# Patient Record
Sex: MALE | URBAN - METROPOLITAN AREA
[De-identification: names, ages, dates, MRNs, and addresses within clinical notes are randomized per-mention and may not be internally consistent; named-entity substitution may affect disease eponyms.]

---

## 2020-02-26 ENCOUNTER — OFFICE VISIT (OUTPATIENT)
Dept: CARDIOLOGY CLINIC | Age: 70
End: 2020-02-26

## 2020-02-26 VITALS
DIASTOLIC BLOOD PRESSURE: 72 MMHG | HEIGHT: 69 IN | WEIGHT: 221 LBS | HEART RATE: 68 BPM | SYSTOLIC BLOOD PRESSURE: 122 MMHG | BODY MASS INDEX: 32.73 KG/M2 | OXYGEN SATURATION: 95 %

## 2020-02-26 DIAGNOSIS — Z95.810 AICD (AUTOMATIC CARDIOVERTER/DEFIBRILLATOR) PRESENT: Primary | ICD-10-CM

## 2020-02-26 DIAGNOSIS — I10 ESSENTIAL HYPERTENSION: ICD-10-CM

## 2020-02-26 DIAGNOSIS — I25.5 ISCHEMIC CARDIOMYOPATHY: ICD-10-CM

## 2020-02-26 DIAGNOSIS — E11.8 CONTROLLED DIABETES MELLITUS TYPE 2 WITH COMPLICATIONS, UNSPECIFIED WHETHER LONG TERM INSULIN USE (HCC): ICD-10-CM

## 2020-02-26 DIAGNOSIS — I48.0 PAROXYSMAL ATRIAL FIBRILLATION (HCC): ICD-10-CM

## 2020-02-26 DIAGNOSIS — I44.7 LBBB (LEFT BUNDLE BRANCH BLOCK): ICD-10-CM

## 2020-02-26 RX ORDER — GLIMEPIRIDE 2 MG/1
TABLET ORAL
COMMUNITY
Start: 2019-06-26

## 2020-02-26 RX ORDER — LINEZOLID 600 MG/1
TABLET, FILM COATED ORAL
COMMUNITY
Start: 2020-02-18

## 2020-02-26 RX ORDER — METOPROLOL TARTRATE 50 MG/1
50 TABLET ORAL 2 TIMES DAILY
COMMUNITY
Start: 2019-10-18

## 2020-02-26 RX ORDER — METFORMIN HYDROCHLORIDE 500 MG/1
500 TABLET ORAL
COMMUNITY
Start: 2019-10-04

## 2020-02-26 RX ORDER — ROSUVASTATIN CALCIUM 40 MG/1
40 TABLET, COATED ORAL DAILY
COMMUNITY
Start: 2020-02-06

## 2020-02-26 RX ORDER — CLOPIDOGREL BISULFATE 75 MG/1
75 TABLET ORAL DAILY
COMMUNITY
Start: 2019-05-28

## 2020-02-26 RX ORDER — LOSARTAN POTASSIUM AND HYDROCHLOROTHIAZIDE 25; 100 MG/1; MG/1
TABLET ORAL
COMMUNITY
Start: 2018-12-20

## 2020-02-26 NOTE — PROGRESS NOTES
History of Present Illness:  75-year-old male here for second opinion. He has seen Dr. Jeane Segura from [facility] for the past five years. 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 The device initially was requested to be placed on the right, but during the procedure it was placed on the left. The patient is also wondering about a second opinion in regards to anticoagulation as he was told he had atrial fibrillation, however when he had an event monitor for a couple weeks earlier in 2019 there was no documentation of atrial fibrillation. He was given prescription for Eliquis by his primary cardiologist.  He is accompanied by daughter. Today he is doing well, he denies any chest pain, dyspnea, PND, orthopnea or edema. Since he got the device he is able to go up three flights of stairs instead of two. He denies any AICD discharges. No bleeding issues. He went to try to get the Eliquis prescribed, although it was very expensive, about $350. Therefore, he is not currently taking it. Impression:  1. Abbott left sided biventricular AICD placed October 17, 2019 by Dr. Sudhakar Valentine. Recent check with normal function. 2. History of ischemic cardiomyopathy, ejection fraction 30%. 3. History of congestive heart failure, currently compensated on Hydrochlorothiazide. 4. History of coronary artery disease and CABG in 2006, stable, no recent stents. 5. Diabetes. 6. Hypertension. 7. Recent new diagnosis of paroxysmal atrial fibrillation without symptoms, given prescription for Eliquis, but not currently taking due to price. 8. Chronic beta blocker and ARB use. 9. Dyslipidemia and statin. 10. History of osteomyelitis, on Linezolid. 11. History of alternating LBBB/RBBB. In regards to his congestive heart failure, it is stable.   His nmajor concern is the atrial fibrillation. I discussed his increased CHADS-vasc2 score and long term anticoagulation even without symptoms. He is worried about the cost of Eliquis. He was therefore given prescription for Xarelto and he can follow up with Dr. Burnetta Goltz. We also talked about the left versus right sided implant. In general, left side is more preferred as it allows a little better shocking vector. He will continue follow up with Dr. Burnetta Goltz and I will be available if any further questions arise, but again, Dr. Abril Mathis, implanting physician, would be reasonable for follow up as well. I do not suspect the left sided implant resulted in the diagnosis of atrial fibrillation. I discussed the paroxysmal nature of atrial fibrillation. Total care time spent in reviewing the case, multiple EMR databases, physician notes, procedure notes, examining the patient, and documentation, is 60 minutes.      Discussed in details with patient. All questions were answered to their full satisfaction. Risk, benefit and alternatives were discussed. More than 50% time spent in counseling and coordination of care. Past Medical History:   Diagnosis Date    Atrial fibrillation ([facility] [address]     CAD (coronary artery disease)     Diabetes (HCC)     Hyperlipidemia        Current Outpatient Medications   Medication Sig Dispense Refill    clopidogreL (PLAVIX) 75 mg tab Take 75 mg by mouth daily.  glimepiride (AMARYL) 2 mg tablet TAKE 1 -2 TABLETS BY MOUTH TWICE DAILY      linezolid (ZYVOX) 600 mg tablet TAKE 1 TABLET BY MOUTH TWICE A DAY      losartan-hydroCHLOROthiazide (HYZAAR) 100-25 mg per tablet TAKE ONE TABLET BY MOUTH ONE TIME DAILY      metFORMIN (GLUCOPHAGE) 500 mg tablet Take 500 mg by mouth.  metoprolol tartrate (LOPRESSOR) 50 mg tablet Take 50 mg by mouth two (2) times a day.  rosuvastatin (CRESTOR) 40 mg tablet Take 40 mg by mouth daily. Social History   reports that he has never smoked.  He has never used smokeless tobacco.   reports previous alcohol use. Family History  family history is not on file. Review of Systems  Except as stated above include:  Constitutional: Negative for fever, chills and malaise/fatigue. HEENT: No congestion or recent URI. Gastrointestinal: No nausea, vomiting, abdominal pain, bloody stools. Pulmonary:  Negative except as stated above. Cardiac:  Negative except as stated above. Musculoskeletal: Negative except as stated above. Neurological:  No localized symptoms. Skin:  Negative except as stated above. Psych:  Negative except as stated above. Endocrine:  Negative except as stated above. PHYSICAL EXAM  BP Readings from Last 3 Encounters:   02/26/20 122/72     Pulse Readings from Last 3 Encounters:   02/26/20 68     Wt Readings from Last 3 Encounters:   02/26/20 100.2 kg (221 lb)     General:   Well developed, well groomed. Head/Neck:   No jugular venous distention     No carotid bruits. No evidence of xanthelasma. Lungs:   No respiratory distress. Clear bilaterally. Heart:    Regular rate and rhythm. Normal S1/S2. Palpation of heart with normal point of maximum impulse. No significant murmurs, rubs or gallops. Left aicd pocket intact. Abdomen:   Soft and nontender. No palpable abdominal mass or bruits. Extremities:   Intact peripheral pulses. No significant edema. Neurological:   Alert and oriented to person, place, time. No focal neurological deficit visually. Skin:   No obvious rash    Blood Pressure Metric:  Monitor recommended and adjustments stated if needed.

## 2024-08-15 ENCOUNTER — HOSPITAL ENCOUNTER (OUTPATIENT)
Facility: HOSPITAL | Age: 74
Discharge: HOME | End: 2024-08-18

## 2024-08-15 DIAGNOSIS — R41.3 OTHER AMNESIA: ICD-10-CM

## 2024-08-19 ENCOUNTER — HOSPITAL ENCOUNTER (OUTPATIENT)
Facility: HOSPITAL | Age: 74
Discharge: HOME | End: 2024-08-22
Payer: MEDICARE

## 2024-08-19 PROCEDURE — 70450 CT HEAD/BRAIN W/O DYE: CPT
